# Patient Record
(demographics unavailable — no encounter records)

---

## 2025-01-21 NOTE — ASSESSMENT
[FreeTextEntry1] : AIDEN ROSSI is a 45 y/o F who presents for evaluation of right 9:00 bx-proven mucocele like lesion w/ calcs, small IDP.   We discussed her pathologic diagnosis of intraductal papilloma and I reviewed her breast imaging in detail.  We discussed management options of interval imaging surveillance vs surgical excision.  She has scant residual calcs on her imaging and no atypia.   She prefers to proceed with surgical excision.    We discussed risks and benefits of procedure.   Surgical plan: Right breast jorge localized lumpectomy (lateral incision) Surgical date: 2/12/2025 CFA   PST, Jorge-loc. Periop instructions were provided Next imaging:  Left Dx MG/US due 5/2025 (birads 3).  RTO for post-op visit. She knows to return sooner if any breast abnormalities or if any breast issues/concerns arise

## 2025-01-21 NOTE — ASSESSMENT
[FreeTextEntry1] : AIDEN ROSSI is a 43 y/o F who presents for evaluation of right 9:00 bx-proven mucocele like lesion w/ calcs, small IDP.   We discussed her pathologic diagnosis of intraductal papilloma and I reviewed her breast imaging in detail.  We discussed management options of interval imaging surveillance vs surgical excision.  She has scant residual calcs on her imaging and no atypia.   She prefers to proceed with surgical excision.    We discussed risks and benefits of procedure.   Surgical plan: Right breast jorge localized lumpectomy (lateral incision) Surgical date: 2/12/2025 CFA   PST, Jorge-loc. Periop instructions were provided Next imaging:  Left Dx MG/US due 5/2025 (birads 3).  RTO for post-op visit. She knows to return sooner if any breast abnormalities or if any breast issues/concerns arise

## 2025-01-21 NOTE — CONSULT LETTER
[FreeTextEntry3] : Sylvia A. Reyes, M.D., MBS, FACS Breast Surgery Division of Surgical Oncology [address] [phone]

## 2025-01-21 NOTE — PHYSICAL EXAM
[Normocephalic] : normocephalic [Atraumatic] : atraumatic [Supple] : supple [No Supraclavicular Adenopathy] : no supraclavicular adenopathy [No Cervical Adenopathy] : no cervical adenopathy [Examined in the supine and seated position] : examined in the supine and seated position [Symmetrical] : symmetrical [Breast Nipple Retraction] : nipples not retracted [No Axillary Lymphadenopathy] : no left axillary lymphadenopathy [Full ROM] : full range of motion [No Rashes] : no rashes [No Ulceration] : no ulceration [de-identified] : Normal respiratory effort [de-identified] : no palpable masses in either breast.  no clinical lymphadenopathy

## 2025-01-21 NOTE — PHYSICAL EXAM
[Normocephalic] : normocephalic [Atraumatic] : atraumatic [Supple] : supple [No Supraclavicular Adenopathy] : no supraclavicular adenopathy [No Cervical Adenopathy] : no cervical adenopathy [Examined in the supine and seated position] : examined in the supine and seated position [Symmetrical] : symmetrical [Breast Nipple Retraction] : nipples not retracted [No Axillary Lymphadenopathy] : no left axillary lymphadenopathy [Full ROM] : full range of motion [No Rashes] : no rashes [No Ulceration] : no ulceration [de-identified] : Normal respiratory effort [de-identified] : no palpable masses in either breast.  no clinical lymphadenopathy

## 2025-01-21 NOTE — HISTORY OF PRESENT ILLNESS
[FreeTextEntry1] : AIDEN ROSSI is a 43 y/o F who presents for evaluation of right 9:00 bx-proven mucocele like lesion w/ calcs, small IDP.  Referred by: Dr. Laura Borrero.  Accompanied by her mother.   She denies any palpable breast masses, nipple discharge or skin changes.  Findings discovered on routine breast screening. No breast complaints.  Reports being placed on Aspirin 81 mg, per patient due to cholesterol - Last time she took medication was 1 year ago.   PMHx: HLD, HTN, Thalassemia trait.  PSHx:  x3. Appendectomy at age 10  Meds: Rosuvastatin 20 mg QD, Bisoprolol-HCTZ 2.5/6.25 mg, folic acid, Liquid Iron PRN.  NKDA Family Hx: Breast cancer (Paternal aunt x2 - one diagnosed in her late 40s, passed and the other in her 70's). Colon cancer (Maternal great grandmother). GYN: , menarche age 10, LMP 2025. Age at first pregnancy 27.  N.  h/o hormone replacement or fertility treatments.  Bra size: 36D  Occupation: Works for the [employer] of [employer].  Social: Former Smoker: Reports only smoking socially/stressors quit at age 26   ETOH: Denies.

## 2025-01-21 NOTE — DATA REVIEWED
[FreeTextEntry1] : BREAST PATH/RAD REVIEW.  [facility].  10/12/22 BL SC MG: birads 1. HD.   10/9/23 BL SC MG: birads 0. HD.  - Left grouped calcs at 12:00 spanning 1.1 cm and at 1:00-2:00 spanning 1.4 cm -2.5 cm.  - Right punctate calcs, unremarkable.   10/18/23 L Dx MG: birads 2. HD. L UOQ layering calcs c/w benign milk of calcium.   10/21/24 BL SC MG: birads 0. HD.  - L UOQ oval mass w/ associated calcs (Rec Dx MG/US)  - L UOQ calcs (Rec Dx MG)  - R UOQ calcs (Rec Dx MG)   11/27/24 BL Dx MG/US: birads 4. HD.  - New R posterior 9:00 grouped calcs (Rec stereo bx)  - L UOQ group of micro-calcs, some that partially layer (Rec 6-month f/u L Dx MG)  - Multiple L complicated cysts (Rec 6-month f/u L Dx US)   12/23/24 Right [[9:00, posterior] stereotactic bx ([facility]): Mucocele like lesion w/ calcs. Proliferative FCC including CCC, CCH with microcalcs. Small IDP. Cork Clip. concordant. high-risk. (Rec surg c/s)

## 2025-01-21 NOTE — CONSULT LETTER
[FreeTextEntry3] : Sylvia A. Reyes, M.D., MBS, FACS Breast Surgery Division of Surgical Oncology [address] [phone]

## 2025-01-21 NOTE — DATA REVIEWED
[FreeTextEntry1] : BREAST PATH/RAD REVIEW.  [facility].  10/12/22 BL SC MG: birads 1. HD.   10/9/23 BL SC MG: birads 0. HD.  - Left grouped calcs at 12:00 spanning 1.1 cm and at 1:00-2:00 spanning 1.4 cm -2.5 cm.  - Right punctate calcs, unremarkable.   10/18/23 L Dx MG: birads 2. HD. L UOQ layering calcs c/w benign milk of calcium.   10/21/24 BL SC MG: birads 0. HD.  - L UOQ oval mass w/ associated calcs (Rec Dx MG/US)  - L UOQ calcs (Rec Dx MG)  - R UOQ calcs (Rec Dx MG)   11/27/24 BL Dx MG/US: birads 4. HD.  - New R posterior 9:00 grouped calcs (Rec stereo bx)  - L UOQ group of micro-calcs, some that partially layer (Rec 6-month f/u L Dx MG)  - Multiple L complicated cysts (Rec 6-month f/u L Dx US)   12/23/24 Right [[9:00, posterior] stereotactic bx ([facility]): Mucocele like lesion w/ calcs. Proliferative FCC including CCC, CCH with microcalcs. Small IDP. Cork Clip. concordant. high-risk. (Rec surg c/s)

## 2025-01-21 NOTE — DATA REVIEWED
[FreeTextEntry1] : BREAST PATH/RAD REVIEW.  [facility].  10/12/22 BL SC MG: birads 1. HD.   10/9/23 BL SC MG: birads 0. HD.  - Left grouped calcs at 12:00 spanning 1.1 cm and at 1:00-2:00 spanning 1.4 cm -2.5 cm.  - Right punctate calcs, unremarkable.   10/18/23 L Dx MG: birads 2. HD. L UOQ layering calcs c/w benign milk of calcium.   10/21/24 BL SC MG: birads 0. HD.  - L UOQ oval mass w/ associated calcs (Rec Dx MG/US)  - L UOQ calcs (Rec Dx MG)  - R UOQ calcs (Rec Dx MG)   11/27/24 BL Dx MG/US: birads 4. HD.  - New R posterior 9:00 grouped calcs (Rec stereo bx)  - L UOQ group of micro-calcs, some that partially layer (Rec 6-month f/u L Dx MG)  - Multiple L complicated cysts (Rec 6-month f/u L Dx US)   12/23/24 Right [[9:00, posterior] stereotactic bx ([facility]): Mucocele like lesion w/ calcs. Proliferative FCC including CCC, CCH with microcalcs. Small IDP. Cork Clip. concordant. high-risk. (Rec surg c/s)

## 2025-01-21 NOTE — PHYSICAL EXAM
[Normocephalic] : normocephalic [Atraumatic] : atraumatic [Supple] : supple [No Supraclavicular Adenopathy] : no supraclavicular adenopathy [No Cervical Adenopathy] : no cervical adenopathy [Examined in the supine and seated position] : examined in the supine and seated position [Symmetrical] : symmetrical [Breast Nipple Retraction] : nipples not retracted [No Axillary Lymphadenopathy] : no left axillary lymphadenopathy [Full ROM] : full range of motion [No Rashes] : no rashes [No Ulceration] : no ulceration [de-identified] : Normal respiratory effort [de-identified] : no palpable masses in either breast.  no clinical lymphadenopathy

## 2025-01-21 NOTE — CONSULT LETTER
[FreeTextEntry3] : Sylvia A. Reyes, M.D., MBS, FACS Breast Surgery Division of Surgical Oncology [address] [phone]

## 2025-01-21 NOTE — HISTORY OF PRESENT ILLNESS
[FreeTextEntry1] : AIDEN ROSSI is a 45 y/o F who presents for evaluation of right 9:00 bx-proven mucocele like lesion w/ calcs, small IDP.  Referred by: Dr. Laura Borrero.  Accompanied by her mother.   She denies any palpable breast masses, nipple discharge or skin changes.  Findings discovered on routine breast screening. No breast complaints.  Reports being placed on Aspirin 81 mg, per patient due to cholesterol - Last time she took medication was 1 year ago.   PMHx: HLD, HTN, Thalassemia trait.  PSHx:  x3. Appendectomy at age 10  Meds: Rosuvastatin 20 mg QD, Bisoprolol-HCTZ 2.5/6.25 mg, folic acid, Liquid Iron PRN.  NKDA Family Hx: Breast cancer (Paternal aunt x2 - one diagnosed in her late 40s, passed and the other in her 70's). Colon cancer (Maternal great grandmother). GYN: , menarche age 10, LMP 2025. Age at first pregnancy 27.  N.  h/o hormone replacement or fertility treatments.  Bra size: 36D  Occupation: Works for the [employer] of [employer].  Social: Former Smoker: Reports only smoking socially/stressors quit at age 26   ETOH: Denies.

## 2025-02-25 NOTE — PHYSICAL EXAM
[Normocephalic] : normocephalic [Atraumatic] : atraumatic [No Axillary Lymphadenopathy] : no left axillary lymphadenopathy [Full ROM] : full range of motion [No Rashes] : no rashes [No Ulceration] : no ulceration [de-identified] : Normal respiratory effort [de-identified] : Right breast surgical incision is c/d/i, well-approximated, moderate seroma, no evidence of infection, no parenchymal defect

## 2025-02-25 NOTE — ASSESSMENT
[FreeTextEntry1] :  45 y/o F w/ right 9:00 bx-proven mucocele like lesion, small IDP s/p excisional biopsy on 2/12/25. Here for post-op check.   We discussed her surgical pathology with right 10:00 incipient radial scar lesion and focal mucosal like lesion devoid of epithelial cells.  She is recovering well from surgery with min-mod residual seroma.   Recommend scar massage.   Next imaging:  Left Dx MG/US due 5/2025 (birads 3).  RTO May 2025 via televideo visit. She knows to return sooner if any breast abnormalities or if any breast issues/concerns arise

## 2025-02-25 NOTE — DATA REVIEWED
[FreeTextEntry1] : BREAST PATH/RAD REVIEW.  [facility].  10/12/22 BL SC MG: birads 1. HD.   10/9/23 BL SC MG: birads 0. HD.  - Left grouped calcs at 12:00 spanning 1.1 cm and at 1:00-2:00 spanning 1.4 cm -2.5 cm.  - Right punctate calcs, unremarkable.   10/18/23 L Dx MG: birads 2. HD. L UOQ layering calcs c/w benign milk of calcium.   10/21/24 BL SC MG: birads 0. HD.  - L UOQ oval mass w/ associated calcs (Rec Dx MG/US)  - L UOQ calcs (Rec Dx MG)  - R UOQ calcs (Rec Dx MG)   11/27/24 BL Dx MG/US: birads 4. HD.  - New R posterior 9:00 grouped calcs (Rec stereo bx)  - L UOQ group of micro-calcs, some that partially layer (Rec 6-month f/u L Dx MG)  - Multiple L complicated cysts (Rec 6-month f/u L Dx US)   12/23/24 Right [[9:00, posterior] stereotactic bx ([facility]): Mucocele like lesion w/ calcs. Proliferative FCC including CCC, CCH with microcalcs. Small IDP. Cork Clip. concordant. high-risk. (Rec surg c/s)   2/12/25 s/p R 10:00 jorge loc Exc Bx: incipient radial scar, focal mucosal like lesion devoid of epithelial cells. Focal florid proliferative FCC. Bx site change.

## 2025-02-25 NOTE — HISTORY OF PRESENT ILLNESS
[FreeTextEntry1] : 45 y/o F w/ right 9:00 bx-proven mucocele like lesion, small IDP s/p excisional biopsy on 25. Here for post-op check.  Referred by: Dr. Laura Borrero.  Accompanied by her mother.   25 (Initial consult): She denies any palpable breast masses, nipple discharge or skin changes.  Findings discovered on routine breast screening. No breast complaints.  Reports being placed on Aspirin 81 mg, per patient due to cholesterol - Last time she took medication was 1 year ago.  25 s/p R 10:00 Exc Bx: incipient radial scar, focal mucosal like lesion devoid of epithelial cells.  25 POD#13. Denies any fevers or chills. Right lateral incision is C/D/I - patient states steri-strips fell off on POD11. She is doing well post-operatively overall, reports some soreness and swelling but pain well controlled.     PMHx: HLD, HTN, Thalassemia trait.  PSHx:  x3. Appendectomy at age 10  Meds: Rosuvastatin 20 mg QD, Bisoprolol-HCTZ 2.5/6.25 mg, folic acid, Liquid Iron PRN.  NKDA Family Hx: Breast cancer (Paternal aunt x2 - one diagnosed in her late 40s, passed and the other in her 70's). Colon cancer (Maternal great grandmother). GYN: , menarche age 10, LMP 2025. Age at first pregnancy 27.  N.  h/o hormone replacement or fertility treatments.  Bra size: 36D  Occupation: Works for the [employer] of [email or web address].  Social: Former Smoker: Reports only smoking socially/stressors quit at age 26   ETOH: Denies.